# Patient Record
Sex: FEMALE | Race: WHITE | NOT HISPANIC OR LATINO | Employment: UNEMPLOYED | URBAN - METROPOLITAN AREA
[De-identification: names, ages, dates, MRNs, and addresses within clinical notes are randomized per-mention and may not be internally consistent; named-entity substitution may affect disease eponyms.]

---

## 2021-03-30 ENCOUNTER — TELEPHONE (OUTPATIENT)
Dept: PAIN MEDICINE | Facility: CLINIC | Age: 26
End: 2021-03-30

## 2021-03-30 NOTE — TELEPHONE ENCOUNTER
----- Message from Cristina Lux DO sent at 3/30/2021  2:40 PM EDT -----   Please schedule the patient for consult from Dr Carole Medeiros, " consider bilateral psoas block"

## 2021-03-30 NOTE — TELEPHONE ENCOUNTER
LMOM FOR PATIENT TO RETURN OUR CALL TO SCHEDULE CONSULT WITH DR VILLAREAL  PER DR BELTRAN    REFERRING  [id number] [address]

## 2021-04-01 ENCOUNTER — CONSULT (OUTPATIENT)
Dept: PAIN MEDICINE | Facility: CLINIC | Age: 26
End: 2021-04-01

## 2021-04-01 VITALS
BODY MASS INDEX: 42.22 KG/M2 | DIASTOLIC BLOOD PRESSURE: 90 MMHG | SYSTOLIC BLOOD PRESSURE: 110 MMHG | TEMPERATURE: 97.5 F | HEIGHT: 67 IN | HEART RATE: 109 BPM | WEIGHT: 269 LBS

## 2021-04-01 DIAGNOSIS — G24.9 DYSTONIA: ICD-10-CM

## 2021-04-01 DIAGNOSIS — M70.72 ILIOPSOAS BURSITIS OF BOTH HIPS: Primary | ICD-10-CM

## 2021-04-01 DIAGNOSIS — M70.71 ILIOPSOAS BURSITIS OF BOTH HIPS: Primary | ICD-10-CM

## 2021-04-01 DIAGNOSIS — M79.18 MYOFASCIAL PAIN SYNDROME: ICD-10-CM

## 2021-04-01 PROCEDURE — 99244 OFF/OP CNSLTJ NEW/EST MOD 40: CPT | Performed by: ANESTHESIOLOGY

## 2021-04-01 NOTE — LETTER
April 1, 2021     [facility] [phone] [id number]    Patient: Vinh Cruz   YOB: 1995   Date of Visit: 4/1/2021       Dear Dr Mijares Or: Thank you for referring Vinh Cruz to me for evaluation  Below are my notes for this consultation  If you have questions, please do not hesitate to call me  I look forward to following your patient along with you  Sincerely,        Onesimo Espinal DO        CC: No Recipients  Onesimo Espinal DO  4/1/2021  8:37 AM  Signed  Assessment  1  Iliopsoas bursitis of both hips    2  Dystonia    3  Myofascial pain syndrome        Plan      I did have an in depth conversation with the patient today regarding the ongoing pain and agree that the patient may benefit from a iliopsoas injection, as she benefitted significantly from trigger point injections and other etiologies of her pain have been ruled out  We discussed the rationale for such an injection  The approach would be demonstrated, which will be anterior in nature to block the iliopsoas complex adjacent to the lesser trochanter  The procedure was discussed in great detail with the patient  Handouts were given  In the office today, we reviewed the nature of the patient's pathology, and the risks of the procedure were discussed with the patient, including, but not limited to bleeding, infection, tissue injury, allergic reaction and paralysis and he provided written and verbal consent  today  My impressions and treatment recommendations were discussed in detail with the patient who verbalized understanding and had no further questions  Discharge instructions were provided  I personally saw and examined the patient and I agree with the above discussed plan of care  This note is created using dictation transcription  It may contain typographical errors, grammatical errors, improperly dictated words, background noise and other errors      Orders Placed This Encounter Procedures    FL spine and pain procedure     Standing Status:   Future     Standing Expiration Date:   4/1/2025     Order Specific Question:   Reason for Exam:     Answer:   Bilateral psoas injection with 0 25% bupivacaine     Order Specific Question:   Is the patient pregnant? Answer:   Unknown     Order Specific Question:   Anticoagulant hold needed? Answer:   no     No orders of the defined types were placed in this encounter  Referred By: Joanna Kelly  History of Present Illness    Emely Basurto is a 22 y o  female  Six month history of recurring low back anterior thigh pain  Previous he was standing psoas injections but the physician left the practice  It is moderate to severe rates as eight to 10/10 on the visual analog scale completely interfering with her daily living activities  Pain is constant worse in the morning and night describes burning cramping shooting with subjective weakness of her lower limbs  She reports that lying down standing bending all aggravate her symptoms will nothing seems to decrease it  She denies any about loss of bowel or bladder function  Physical therapy in the past provided no relief exercise and chiropractic treatment provided no relief nerve blocks have provided excellent relief  I have personally reviewed and/or updated the patient's past medical history, past surgical history, family history, social history, current medications, allergies, and vital signs today  Review of Systems   Constitutional: Positive for unexpected weight change  Negative for fever  HENT: Negative for trouble swallowing  Eyes: Negative for visual disturbance  Respiratory: Negative for shortness of breath and wheezing  Cardiovascular: Negative for chest pain and palpitations  Gastrointestinal: Negative for constipation, diarrhea, nausea and vomiting  Endocrine: Negative for cold intolerance, heat intolerance and polydipsia     Genitourinary: Negative for difficulty urinating and frequency  Musculoskeletal: Positive for arthralgias and myalgias  Negative for gait problem and joint swelling  Skin: Negative for rash  Neurological: Negative for dizziness, seizures, syncope, weakness and headaches  Hematological: Does not bruise/bleed easily  Psychiatric/Behavioral: Positive for dysphoric mood  All other systems reviewed and are negative  There is no problem list on file for this patient  History reviewed  No pertinent past medical history  History reviewed  No pertinent surgical history  Family History   Problem Relation Age of Onset    Multiple sclerosis Mother     Fibromyalgia Mother        Social History     Occupational History    Not on file   Tobacco Use    Smoking status: Never Smoker    Smokeless tobacco: Never Used   Substance and Sexual Activity    Alcohol use: Yes    Drug use: Not Currently    Sexual activity: Not Currently       No current outpatient medications on file prior to visit  No current facility-administered medications on file prior to visit  No Known Allergies    Physical Exam    /90 (BP Location: Left arm, Patient Position: Sitting, Cuff Size: Standard)   Pulse (!) 109   Temp 97 5 °F (36 4 °C)   Ht 5' 7" (1 702 m)   Wt 122 kg (269 lb)   BMI 42 13 kg/m²     Constitutional: normal, well developed, well nourished, alert, in no distress and non-toxic and no overt pain behavior  and obese  Eyes: anicteric  HEENT: grossly intact  Neck: supple, symmetric, trachea midline and no masses   Pulmonary:even and unlabored  Cardiovascular:No edema or pitting edema present  Skin:Normal without rashes or lesions and well hydrated  Psychiatric:Mood and affect appropriate  Neurologic:Cranial Nerves II-XII grossly intact  Musculoskeletal:normal ,   Inspection:  Normal station and gait  Normal lumbar lordotic curve with no significant scoliosis or spinal step-off  Skin intact without erythema    No gross mass or muscle atrophy  Palpation:  There is no tenderness to palpation overlying the sacroiliac joint as well as the ischial bursa bilateral   No significant tenderness over the greater trochanteric bursa bilaterally  Motor/Strength:  5/5 strength in the bilateral lower limbs  The patient is able to heel and/toe walk  Tandem gait is intact  Reflexes: Deep tendon reflex are 2+ and symmetrical bilateral patella and Achilles  Sensation:   Sensation intact to light touch and pinprick in the bilateral lower limbs  Proprioception is intact at bilateral hallux  Maneuvers: Negative bilateral straight leg raising  Negative Tim's maneuver  Positive bilateral Obraztsova's sign  Imaging  MRI Lumbar spine @ [facility] 9-3-19  Impression:  Abnormal displacement of posterior nerve roots within the upper lumbar spine of uncertain etiology  Potential etiologies include poorly seen arachnoid cyst, poorly seen congenital dorsal dural defect, or scarring from prior arachnoiditis  I have personally reviewed pertinent films in PACS

## 2021-04-01 NOTE — PROGRESS NOTES
Assessment  1  Iliopsoas bursitis of both hips    2  Dystonia    3  Myofascial pain syndrome        Plan      I did have an in depth conversation with the patient today regarding the ongoing pain and agree that the patient may benefit from a iliopsoas injection, as she benefitted significantly from trigger point injections and other etiologies of her pain have been ruled out  We discussed the rationale for such an injection  The approach would be demonstrated, which will be anterior in nature to block the iliopsoas complex adjacent to the lesser trochanter  The procedure was discussed in great detail with the patient  Handouts were given  In the office today, we reviewed the nature of the patient's pathology, and the risks of the procedure were discussed with the patient, including, but not limited to bleeding, infection, tissue injury, allergic reaction and paralysis and he provided written and verbal consent  today  My impressions and treatment recommendations were discussed in detail with the patient who verbalized understanding and had no further questions  Discharge instructions were provided  I personally saw and examined the patient and I agree with the above discussed plan of care  This note is created using dictation transcription  It may contain typographical errors, grammatical errors, improperly dictated words, background noise and other errors  Orders Placed This Encounter   Procedures    FL spine and pain procedure     Standing Status:   Future     Standing Expiration Date:   4/1/2025     Order Specific Question:   Reason for Exam:     Answer:   Bilateral psoas injection with 0 25% bupivacaine     Order Specific Question:   Is the patient pregnant? Answer:   Unknown     Order Specific Question:   Anticoagulant hold needed? Answer:   no     No orders of the defined types were placed in this encounter      Referred By: Shae Smart  History of Present Illness    Ambrielle Chantal Syed is a 22 y o  female  Six month history of recurring low back anterior thigh pain  Previous he was standing psoas injections but the physician left the practice  It is moderate to severe rates as eight to 10/10 on the visual analog scale completely interfering with her daily living activities  Pain is constant worse in the morning and night describes burning cramping shooting with subjective weakness of her lower limbs  She reports that lying down standing bending all aggravate her symptoms will nothing seems to decrease it  She denies any about loss of bowel or bladder function  Physical therapy in the past provided no relief exercise and chiropractic treatment provided no relief nerve blocks have provided excellent relief  I have personally reviewed and/or updated the patient's past medical history, past surgical history, family history, social history, current medications, allergies, and vital signs today  Review of Systems   Constitutional: Positive for unexpected weight change  Negative for fever  HENT: Negative for trouble swallowing  Eyes: Negative for visual disturbance  Respiratory: Negative for shortness of breath and wheezing  Cardiovascular: Negative for chest pain and palpitations  Gastrointestinal: Negative for constipation, diarrhea, nausea and vomiting  Endocrine: Negative for cold intolerance, heat intolerance and polydipsia  Genitourinary: Negative for difficulty urinating and frequency  Musculoskeletal: Positive for arthralgias and myalgias  Negative for gait problem and joint swelling  Skin: Negative for rash  Neurological: Negative for dizziness, seizures, syncope, weakness and headaches  Hematological: Does not bruise/bleed easily  Psychiatric/Behavioral: Positive for dysphoric mood  All other systems reviewed and are negative  There is no problem list on file for this patient  History reviewed   No pertinent past medical history  History reviewed  No pertinent surgical history  Family History   Problem Relation Age of Onset    Multiple sclerosis Mother     Fibromyalgia Mother        Social History     Occupational History    Not on file   Tobacco Use    Smoking status: Never Smoker    Smokeless tobacco: Never Used   Substance and Sexual Activity    Alcohol use: Yes    Drug use: Not Currently    Sexual activity: Not Currently       No current outpatient medications on file prior to visit  No current facility-administered medications on file prior to visit  No Known Allergies    Physical Exam    /90 (BP Location: Left arm, Patient Position: Sitting, Cuff Size: Standard)   Pulse (!) 109   Temp 97 5 °F (36 4 °C)   Ht 5' 7" (1 702 m)   Wt 122 kg (269 lb)   BMI 42 13 kg/m²     Constitutional: normal, well developed, well nourished, alert, in no distress and non-toxic and no overt pain behavior  and obese  Eyes: anicteric  HEENT: grossly intact  Neck: supple, symmetric, trachea midline and no masses   Pulmonary:even and unlabored  Cardiovascular:No edema or pitting edema present  Skin:Normal without rashes or lesions and well hydrated  Psychiatric:Mood and affect appropriate  Neurologic:Cranial Nerves II-XII grossly intact  Musculoskeletal:normal ,   Inspection:  Normal station and gait  Normal lumbar lordotic curve with no significant scoliosis or spinal step-off  Skin intact without erythema  No gross mass or muscle atrophy  Palpation:  There is no tenderness to palpation overlying the sacroiliac joint as well as the ischial bursa bilateral   No significant tenderness over the greater trochanteric bursa bilaterally  Motor/Strength:  5/5 strength in the bilateral lower limbs  The patient is able to heel and/toe walk  Tandem gait is intact     Reflexes: Deep tendon reflex are 2+ and symmetrical bilateral patella and Achilles  Sensation:   Sensation intact to light touch and pinprick in the bilateral lower limbs  Proprioception is intact at bilateral hallux  Maneuvers: Negative bilateral straight leg raising  Negative Tim's maneuver  Positive bilateral Obraztsova's sign  Imaging  MRI Lumbar spine @ [facility] 9-3-19  Impression:  Abnormal displacement of posterior nerve roots within the upper lumbar spine of uncertain etiology  Potential etiologies include poorly seen arachnoid cyst, poorly seen congenital dorsal dural defect, or scarring from prior arachnoiditis  I have personally reviewed pertinent films in PACS

## 2021-04-01 NOTE — PATIENT INSTRUCTIONS
Chronic Pain   AMBULATORY CARE:   Chronic pain  is pain that does not get better for 3 months or longer  Chronic pain may hurt all the time, or come and go  Call your local emergency number or have someone else call (911 in the [address]) if:   · You are breathing slower than normal, or you have trouble breathing  · You cannot be awakened  · You have a seizure  Call your doctor if:   · Your heart feels like it is jumping or fluttering  · You cannot think clearly  · You have side effects from prescription pain medicine, such as itching, nausea, or vomiting  · You have trouble sleeping  · Your pain gets worse, even after you take medicine  · You don't think the medicine is working  · You have questions or concerns about your condition or care  Treatment for chronic pain  may need any of the following:  · Acetaminophen  decreases pain and fever  It is available without a doctor's order  Ask how much to take and how often to take it  Follow directions  Read the labels of all other medicines you are using to see if they also contain acetaminophen, or ask your doctor or pharmacist  Acetaminophen can cause liver damage if not taken correctly  Do not use more than 4 grams (4,000 milligrams) total of acetaminophen in one day  · NSAIDs , such as ibuprofen, help decrease swelling, pain, and fever  This medicine is available with or without a doctor's order  NSAIDs can cause stomach bleeding or kidney problems in certain people  If you take blood thinner medicine, always ask your healthcare provider if NSAIDs are safe for you  Always read the medicine label and follow directions  · Prescription pain medicine  called narcotics or opioids may be given for certain types of chronic pain  Ask your healthcare provider how to take this medicine safely  · Anesthetics  can be rubbed on your skin or injected into a nerve or muscle to numb an area      · Other medicines  may reduce pain, anxiety, muscle tension, or swelling  Manage your chronic pain:   · Apply heat  on the area in pain for 20 to 30 minutes every 2 hours for as many days as directed  Heat helps decrease pain and muscle spasms  · Apply ice  on the part of your body that hurts for 15 to 20 minutes every hour or as directed  Use an ice pack, or put crushed ice in a plastic bag  Cover it with a towel  Ice decreases pain and swelling, and helps prevent tissue damage  · Go to physical therapy as directed  A physical therapist teaches you exercises to help improve movement and strength, and to decrease pain  · Exercise for 30 minutes, 3 times a week  Regular physical activity can help decrease pain and improve your quality of life  Ask your healthcare provider about the best exercise plan for your type of pain  · Get enough sleep  Create a relaxing bedtime routine  Go to sleep and wake up at the same time every day  Avoid caffeine in the afternoon  · Talk with a counselor or therapist   A type of counseling called cognitive behavioral therapy (CBT) can help your chronic pain by changing the way you think about it  CBT can also improve your mood, sleep, and ability to move  What you must know if you take narcotic pain medicine:   · You may need to take a bowel movement softener  The most common side effect of prescription pain medicine is constipation  Bowel movement softeners are available over the counter  · Do not mix prescription pain medicines  This can cause an overdose of medicine, which can become life-threatening  Read labels  Make sure you know the ingredients in all of your medicines  · Do not drink alcohol  when you take prescription pain medicine  It is not safe to mix narcotics or opioids with alcohol or illegal drugs  · Prescription pain medicine may impair your ability to drive or work safely  They may also cause dizziness and increase your risk for falling       · Store prescription pain medicine in a safe location at home  Keep your medicine away from children and other people  Never share your medicine with anyone  Follow up with your healthcare provider as directed: You may be referred to a pain specialist  Write down your questions so you remember to ask them during your visits  © Copyright [address] Information is for End User's use only and may not be sold, redistributed or otherwise used for commercial purposes  All illustrations and images included in CareNotes® are the copyrighted property of [employer]  or [facility] [place]   The above information is an  only  It is not intended as medical advice for individual conditions or treatments  Talk to your doctor, nurse or pharmacist before following any medical regimen to see if it is safe and effective for you

## 2021-04-02 ENCOUNTER — TELEPHONE (OUTPATIENT)
Dept: PAIN MEDICINE | Facility: CLINIC | Age: 26
End: 2021-04-02

## 2021-04-02 NOTE — TELEPHONE ENCOUNTER
AS PER CONG NOTE SHE SPOKE WITH A REPRESENTATIVE AND THERE WAS TO BE COBRA INSURANCE          HODA NOTES:    INSURANCES USUALLY ALWAYS TERM ON THE LAST DAY OF THE MONTH NEVER THE 1ST OF THE MONTH       SPOKE TO KAYLEE MCINTOSH  AT [address] [place] [address] 0401/21  AS OF 04/01/21 SHE DOES NOT HAVE ANY COVERAGE       THERE IS NO COBRA INSURANCE PENDING      REF # [id number]    TERMED 04/01/21    INITIATED ANOTHER PHONE CALL TO INSURANCE      PER April    THIS INSURANCE TERMED 04/01/21     #REF # [id number]

## 2021-04-02 NOTE — TELEPHONE ENCOUNTER
Tried reaching out to patient to see if she has any additional insurance to add    Had to leave a voice  mail To return my call       lmom that we were cancelling her procedure on Monday until we get insurance

## 2021-04-05 ENCOUNTER — TELEPHONE (OUTPATIENT)
Dept: RADIOLOGY | Facility: CLINIC | Age: 26
End: 2021-04-05

## 2021-04-05 ENCOUNTER — HOSPITAL ENCOUNTER (OUTPATIENT)
Dept: RADIOLOGY | Facility: CLINIC | Age: 26
Discharge: HOME | End: 2021-04-05
Attending: ANESTHESIOLOGY | Admitting: ANESTHESIOLOGY
Payer: COMMERCIAL

## 2021-04-05 VITALS
OXYGEN SATURATION: 95 % | TEMPERATURE: 97.2 F | RESPIRATION RATE: 20 BRPM | DIASTOLIC BLOOD PRESSURE: 84 MMHG | HEART RATE: 97 BPM | SYSTOLIC BLOOD PRESSURE: 140 MMHG

## 2021-04-05 DIAGNOSIS — M70.71 ILIOPSOAS BURSITIS OF BOTH HIPS: ICD-10-CM

## 2021-04-05 DIAGNOSIS — G24.9 DYSTONIA: ICD-10-CM

## 2021-04-05 DIAGNOSIS — M70.72 ILIOPSOAS BURSITIS OF BOTH HIPS: ICD-10-CM

## 2021-04-05 PROCEDURE — 77002 NEEDLE LOCALIZATION BY XRAY: CPT | Performed by: ANESTHESIOLOGY

## 2021-04-05 PROCEDURE — 77002 NEEDLE LOCALIZATION BY XRAY: CPT

## 2021-04-05 PROCEDURE — 20550 NJX 1 TENDON SHEATH/LIGAMENT: CPT | Performed by: ANESTHESIOLOGY

## 2021-04-05 RX ORDER — BUPIVACAINE HCL/PF 2.5 MG/ML
10 VIAL (ML) INJECTION ONCE
Status: COMPLETED | OUTPATIENT
Start: 2021-04-05 | End: 2021-04-05

## 2021-04-05 RX ADMIN — BUPIVACAINE HYDROCHLORIDE 6 ML: 2.5 INJECTION, SOLUTION EPIDURAL; INFILTRATION; INTRACAUDAL at 15:10

## 2021-04-05 NOTE — DISCHARGE INSTRUCTIONS
1  Do not apply heat to any area that is numb  If you have discomfort or soreness at the injection site, you may apply ice today, 20 minutes on and 20 minutes off  Tomorrow you may use ice or warm, moist heat  Do not apply ice or heat directly to the skin  2  If you experience severe shortness of breath, go to the Emergency Room  3  You may have numbness for several hours from the local anesthetic  Please use caution and common sense, especially with weight-bearing activities  4  You may have an increase or change in the discomfort for 36-48 hours after your treatment  Apply ice and continue with any pain medicine you have been prescribed  5  Do not do anything strenuous today  You may shower, but no tub baths or hot tubs today  You may resume your normal activities tomorrow, but do not overdo it  Resume normal activities slowly when you are feeling better  6  If you experience redness, drainage or swelling at the injection site, or if you develop a fever above 100 degrees, please call [facility] at [phone] or go to the Emergency Room  7  Continue to take all routine medicines prescribed by your primary care physician unless otherwise instructed by our staff  Most blood thinners should be started again according to your regularly scheduled dosing  If you have any questions, please give our office a call  As no general anesthesia was used in today's procedure, you should not experience any side effects related to anesthesia  If you have a problem specifically related to your procedure, please call our office at [phone]  Problems not related to your procedure should be directed to your primary care physician

## 2021-04-05 NOTE — TELEPHONE ENCOUNTER
Called and [facility] for patient to call back to see if she wanted to come in at 2:20 for her procedure   Please ask for Eber MCINTOSH

## 2021-04-05 NOTE — H&P
History of Present Illness: The patient is a 22 y o  female who presents with complaints of  Low back pain  Patient Active Problem List   Diagnosis    Iliopsoas bursitis of both hips    Dystonia       No past medical history on file  No past surgical history on file  No current outpatient medications on file  Current Facility-Administered Medications:     bupivacaine (PF) (MARCAINE) 0 25 % injection 10 mL, 10 mL, Perineural, Once, Steven Nava, DO    No Known Allergies    Physical Exam:   General: Awake, Alert, Oriented x 3, Mood and affect appropriate  Respiratory: Respirations even and unlabored  Cardiovascular: Peripheral pulses intact; no edema  Musculoskeletal Exam:   The pain with lumbar extension    ASA Score: III         Assessment:   1  Iliopsoas bursitis of both hips    2   Dystonia        Plan: Bilateral psoas injection with 0 25% bupivacaine

## 2021-04-12 ENCOUNTER — TELEPHONE (OUTPATIENT)
Dept: PAIN MEDICINE | Facility: CLINIC | Age: 26
End: 2021-04-12